# Patient Record
Sex: FEMALE | Race: WHITE | NOT HISPANIC OR LATINO | Employment: OTHER | URBAN - METROPOLITAN AREA
[De-identification: names, ages, dates, MRNs, and addresses within clinical notes are randomized per-mention and may not be internally consistent; named-entity substitution may affect disease eponyms.]

---

## 2017-10-04 ENCOUNTER — TELEPHONE (OUTPATIENT)
Dept: FAMILY MEDICINE | Facility: CLINIC | Age: 67
End: 2017-10-04

## 2017-10-04 NOTE — TELEPHONE ENCOUNTER
----- Message from Swetha Castaneda sent at 10/3/2017  4:28 PM CDT -----  Patient is calling for the copies of her MRI. She says that her doctor has been trying for over a month to get this. Patient states that the doctor said she could get her copies at no charge. Please call to advise at [phone].

## 2017-10-04 NOTE — TELEPHONE ENCOUNTER
SPoke to patient. Gave patient phone number for medical records. Patient was inquiring about surgical hx and immunization hx. Sent patient information via mail.